# Patient Record
Sex: FEMALE | Race: WHITE
[De-identification: names, ages, dates, MRNs, and addresses within clinical notes are randomized per-mention and may not be internally consistent; named-entity substitution may affect disease eponyms.]

---

## 2017-03-02 ENCOUNTER — HOSPITAL ENCOUNTER (EMERGENCY)
Dept: HOSPITAL 23 - CFTX | Age: 42
Discharge: HOME | End: 2017-03-02
Payer: COMMERCIAL

## 2017-03-02 DIAGNOSIS — Z88.1: ICD-10-CM

## 2017-03-02 DIAGNOSIS — Z88.8: ICD-10-CM

## 2017-03-02 DIAGNOSIS — M54.5: ICD-10-CM

## 2017-03-02 DIAGNOSIS — J44.1: ICD-10-CM

## 2017-03-02 DIAGNOSIS — F17.210: ICD-10-CM

## 2017-03-02 DIAGNOSIS — G89.29: Primary | ICD-10-CM

## 2017-03-02 DIAGNOSIS — Z98.890: ICD-10-CM

## 2017-03-09 ENCOUNTER — HOSPITAL ENCOUNTER (EMERGENCY)
Dept: HOSPITAL 23 - CED | Age: 42
Discharge: HOME | End: 2017-03-09
Payer: COMMERCIAL

## 2017-03-09 DIAGNOSIS — G89.29: ICD-10-CM

## 2017-03-09 DIAGNOSIS — Z88.8: ICD-10-CM

## 2017-03-09 DIAGNOSIS — M54.9: ICD-10-CM

## 2017-03-09 DIAGNOSIS — R10.9: Primary | ICD-10-CM

## 2017-03-09 DIAGNOSIS — E11.9: ICD-10-CM

## 2017-03-09 DIAGNOSIS — Z88.1: ICD-10-CM

## 2017-03-09 DIAGNOSIS — Z90.710: ICD-10-CM

## 2017-03-09 DIAGNOSIS — F20.9: ICD-10-CM

## 2017-03-09 DIAGNOSIS — J45.909: ICD-10-CM

## 2017-03-09 LAB
GENTAMICIN PEAK SERPL-MCNC: NO MG/L
URBCS1 AUWI: (no result) /[HPF] (ref 0–2)
URINE APPEARANCE: CLEAR
URINE BACTERIA AUWI: (no result)
URINE BILIRUBIN: (no result)
URINE BLOOD: (no result)
URINE COLOR: YELLOW
URINE GLUCOSE: (no result) MG/DL
URINE KETONE: (no result)
URINE LEUKOCYTE ESTERASE: (no result)
URINE NITRATE: (no result)
URINE PH: 5 (ref 5–8)
URINE PROTEIN: (no result)
URINE SOURCE: (no result)
URINE SPECIFIC GRAVITY: 1.02 (ref 1–1.03)
URINE SQUAMOUS EPITHELIAL CELL: (no result) /[HPF]
URINE UROBILINOGEN: 0.2 MG/DL
UWBCS1 AUWI: (no result) (ref 0–5)

## 2017-03-14 ENCOUNTER — HOSPITAL ENCOUNTER (EMERGENCY)
Dept: HOSPITAL 23 - CED | Age: 42
Discharge: HOME | End: 2017-03-14
Payer: COMMERCIAL

## 2017-03-14 DIAGNOSIS — F17.210: ICD-10-CM

## 2017-03-14 DIAGNOSIS — I10: ICD-10-CM

## 2017-03-14 DIAGNOSIS — R05: ICD-10-CM

## 2017-03-14 DIAGNOSIS — R07.89: ICD-10-CM

## 2017-03-14 DIAGNOSIS — E11.9: ICD-10-CM

## 2017-03-14 DIAGNOSIS — Z88.1: ICD-10-CM

## 2017-03-14 DIAGNOSIS — M54.42: Primary | ICD-10-CM

## 2017-03-14 DIAGNOSIS — Z88.8: ICD-10-CM

## 2017-03-24 ENCOUNTER — HOSPITAL ENCOUNTER (EMERGENCY)
Dept: HOSPITAL 23 - CFTX | Age: 42
Discharge: HOME | End: 2017-03-24
Payer: COMMERCIAL

## 2017-03-24 DIAGNOSIS — F17.210: ICD-10-CM

## 2017-03-24 DIAGNOSIS — F20.9: ICD-10-CM

## 2017-03-24 DIAGNOSIS — S29.012A: ICD-10-CM

## 2017-03-24 DIAGNOSIS — Z88.1: ICD-10-CM

## 2017-03-24 DIAGNOSIS — J44.9: ICD-10-CM

## 2017-03-24 DIAGNOSIS — S39.012A: Primary | ICD-10-CM

## 2017-03-24 DIAGNOSIS — E10.9: ICD-10-CM

## 2017-03-24 DIAGNOSIS — J45.909: ICD-10-CM

## 2017-03-24 DIAGNOSIS — I10: ICD-10-CM
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
## 2017-04-16 ENCOUNTER — HOSPITAL ENCOUNTER (EMERGENCY)
Dept: HOSPITAL 23 - CFTX | Age: 42
Discharge: HOME | End: 2017-04-16
Payer: COMMERCIAL

## 2017-04-16 DIAGNOSIS — M54.41: ICD-10-CM

## 2017-04-16 DIAGNOSIS — M54.42: Primary | ICD-10-CM

## 2017-04-16 DIAGNOSIS — F17.210: ICD-10-CM

## 2017-04-16 DIAGNOSIS — Z88.8: ICD-10-CM

## 2017-04-16 DIAGNOSIS — Z88.1: ICD-10-CM

## 2017-04-16 DIAGNOSIS — E11.9: ICD-10-CM

## 2017-04-16 DIAGNOSIS — K08.89: ICD-10-CM

## 2017-04-16 DIAGNOSIS — I10: ICD-10-CM

## 2017-04-24 ENCOUNTER — HOSPITAL ENCOUNTER (EMERGENCY)
Dept: HOSPITAL 23 - CFTX | Age: 42
Discharge: HOME | End: 2017-04-24
Payer: COMMERCIAL

## 2017-04-24 DIAGNOSIS — F17.210: ICD-10-CM

## 2017-04-24 DIAGNOSIS — M54.42: Primary | ICD-10-CM

## 2017-04-24 DIAGNOSIS — Z88.1: ICD-10-CM

## 2017-04-24 DIAGNOSIS — I10: ICD-10-CM

## 2017-04-24 DIAGNOSIS — M54.41: ICD-10-CM

## 2017-04-24 DIAGNOSIS — Z88.8: ICD-10-CM

## 2017-04-28 ENCOUNTER — HOSPITAL ENCOUNTER (EMERGENCY)
Dept: HOSPITAL 23 - CED | Age: 42
Discharge: HOME | End: 2017-04-28
Payer: COMMERCIAL

## 2017-04-28 DIAGNOSIS — M25.571: Primary | ICD-10-CM

## 2017-04-28 DIAGNOSIS — E11.9: ICD-10-CM

## 2017-04-28 DIAGNOSIS — I10: ICD-10-CM

## 2017-04-28 DIAGNOSIS — F20.9: ICD-10-CM

## 2017-04-28 DIAGNOSIS — F17.210: ICD-10-CM

## 2017-04-28 DIAGNOSIS — J45.909: ICD-10-CM

## 2017-06-19 ENCOUNTER — HOSPITAL ENCOUNTER (EMERGENCY)
Dept: HOSPITAL 23 - CED | Age: 42
Discharge: HOME | End: 2017-06-19
Payer: COMMERCIAL

## 2017-06-19 DIAGNOSIS — F20.9: ICD-10-CM

## 2017-06-19 DIAGNOSIS — Z79.84: ICD-10-CM

## 2017-06-19 DIAGNOSIS — Z98.890: ICD-10-CM

## 2017-06-19 DIAGNOSIS — I10: ICD-10-CM

## 2017-06-19 DIAGNOSIS — E78.5: ICD-10-CM

## 2017-06-19 DIAGNOSIS — E11.9: ICD-10-CM

## 2017-06-19 DIAGNOSIS — Z79.899: ICD-10-CM

## 2017-06-19 DIAGNOSIS — Z79.82: ICD-10-CM

## 2017-06-19 DIAGNOSIS — G89.29: ICD-10-CM

## 2017-06-19 DIAGNOSIS — Z88.8: ICD-10-CM

## 2017-06-19 DIAGNOSIS — M54.41: Primary | ICD-10-CM

## 2017-06-19 DIAGNOSIS — F17.210: ICD-10-CM

## 2017-06-19 DIAGNOSIS — M54.42: ICD-10-CM

## 2017-08-03 ENCOUNTER — HOSPITAL ENCOUNTER (EMERGENCY)
Dept: HOSPITAL 23 - CED | Age: 42
Discharge: HOME | End: 2017-08-03
Payer: COMMERCIAL

## 2017-08-03 VITALS — BODY MASS INDEX: 42.11 KG/M2 | WEIGHT: 262 LBS | HEIGHT: 66 IN

## 2017-08-03 DIAGNOSIS — Z88.8: ICD-10-CM

## 2017-08-03 DIAGNOSIS — E11.9: ICD-10-CM

## 2017-08-03 DIAGNOSIS — K08.89: Primary | ICD-10-CM

## 2017-08-03 DIAGNOSIS — I10: ICD-10-CM

## 2017-08-03 DIAGNOSIS — F17.210: ICD-10-CM

## 2017-08-03 DIAGNOSIS — Z88.1: ICD-10-CM

## 2017-08-04 ENCOUNTER — HOSPITAL ENCOUNTER (EMERGENCY)
Dept: HOSPITAL 23 - CED | Age: 42
Discharge: HOME | End: 2017-08-04
Payer: COMMERCIAL

## 2017-08-04 VITALS — BODY MASS INDEX: 46.92 KG/M2 | HEIGHT: 66 IN | WEIGHT: 291.98 LBS

## 2017-08-04 DIAGNOSIS — K08.89: Primary | ICD-10-CM

## 2017-08-04 DIAGNOSIS — I10: ICD-10-CM

## 2017-08-04 DIAGNOSIS — F17.200: ICD-10-CM

## 2017-09-07 ENCOUNTER — HOSPITAL ENCOUNTER (EMERGENCY)
Dept: HOSPITAL 23 - CFTX | Age: 42
Discharge: HOME | End: 2017-09-07
Payer: COMMERCIAL

## 2017-09-07 VITALS — WEIGHT: 270.99 LBS | BODY MASS INDEX: 43.55 KG/M2 | HEIGHT: 66 IN

## 2017-09-07 DIAGNOSIS — I10: ICD-10-CM

## 2017-09-07 DIAGNOSIS — G89.29: Primary | ICD-10-CM

## 2017-09-07 DIAGNOSIS — Z88.8: ICD-10-CM

## 2017-09-07 DIAGNOSIS — M54.42: ICD-10-CM

## 2017-09-07 DIAGNOSIS — M54.41: ICD-10-CM

## 2017-09-07 DIAGNOSIS — F17.210: ICD-10-CM

## 2017-09-07 DIAGNOSIS — Z88.1: ICD-10-CM

## 2017-09-14 ENCOUNTER — HOSPITAL ENCOUNTER (EMERGENCY)
Dept: HOSPITAL 23 - CED | Age: 42
Discharge: HOME | End: 2017-09-14
Payer: COMMERCIAL

## 2017-09-14 VITALS — BODY MASS INDEX: 43.55 KG/M2 | HEIGHT: 66 IN | WEIGHT: 270.99 LBS

## 2017-09-14 DIAGNOSIS — F17.200: ICD-10-CM

## 2017-09-14 DIAGNOSIS — J06.9: ICD-10-CM

## 2017-09-14 DIAGNOSIS — E11.9: ICD-10-CM

## 2017-09-14 DIAGNOSIS — M54.5: Primary | ICD-10-CM

## 2017-09-14 DIAGNOSIS — Z88.8: ICD-10-CM

## 2017-09-14 DIAGNOSIS — I10: ICD-10-CM

## 2017-09-18 ENCOUNTER — HOSPITAL ENCOUNTER (EMERGENCY)
Dept: HOSPITAL 23 - CED | Age: 42
Discharge: HOME | End: 2017-09-18
Payer: COMMERCIAL

## 2017-09-18 VITALS — HEIGHT: 66 IN | WEIGHT: 270.99 LBS | BODY MASS INDEX: 43.55 KG/M2

## 2017-09-18 DIAGNOSIS — Z88.8: ICD-10-CM

## 2017-09-18 DIAGNOSIS — J45.909: ICD-10-CM

## 2017-09-18 DIAGNOSIS — I10: ICD-10-CM

## 2017-09-18 DIAGNOSIS — Z88.0: ICD-10-CM

## 2017-09-18 DIAGNOSIS — J06.9: Primary | ICD-10-CM

## 2017-09-18 DIAGNOSIS — E11.9: ICD-10-CM

## 2017-09-18 DIAGNOSIS — F17.210: ICD-10-CM

## 2017-12-04 ENCOUNTER — TELEPHONE (OUTPATIENT)
Dept: OBSTETRICS AND GYNECOLOGY | Facility: CLINIC | Age: 42
End: 2017-12-04

## 2017-12-04 NOTE — TELEPHONE ENCOUNTER
Saige    The earliest I can see her is at her scheduled appt on 12/12/2017.    Thanks    Michaela    ----- Message from Saige Churchillzan sent at 12/1/2017  9:48 AM EST -----  Patient is admitted currently at [facility] for pelvic pain and high volumes of discharge. The nurse that called stated that the patient seen Dr. Jennings, but I am seeing no show appointments. I scheduled her for the next available NP spot, but they were wondering if she could be seen sooner? The nurse stated that she doesn't see any signs of discharge by Monday. Please advise.    Call back #: [phone] (Nurse at [facility])

## 2018-02-09 ENCOUNTER — TELEPHONE (OUTPATIENT)
Dept: OBSTETRICS AND GYNECOLOGY | Facility: CLINIC | Age: 43
End: 2018-02-09

## 2020-06-11 ENCOUNTER — TELEPHONE (OUTPATIENT)
Dept: OBSTETRICS AND GYNECOLOGY | Facility: CLINIC | Age: 45
End: 2020-06-11

## 2020-07-30 ENCOUNTER — TELEPHONE (OUTPATIENT)
Dept: OBSTETRICS AND GYNECOLOGY | Facility: CLINIC | Age: 45
End: 2020-07-30

## 2020-09-18 ENCOUNTER — TELEPHONE (OUTPATIENT)
Dept: OBSTETRICS AND GYNECOLOGY | Facility: CLINIC | Age: 45
End: 2020-09-18